# Patient Record
Sex: FEMALE | URBAN - METROPOLITAN AREA
[De-identification: names, ages, dates, MRNs, and addresses within clinical notes are randomized per-mention and may not be internally consistent; named-entity substitution may affect disease eponyms.]

---

## 2017-10-06 ENCOUNTER — EMERGENCY (EMERGENCY)
Facility: HOSPITAL | Age: 47
LOS: 1 days | Discharge: HOME | End: 2017-10-06
Attending: EMERGENCY MEDICINE | Admitting: EMERGENCY MEDICINE
Payer: COMMERCIAL

## 2017-10-06 VITALS
DIASTOLIC BLOOD PRESSURE: 98 MMHG | RESPIRATION RATE: 18 BRPM | SYSTOLIC BLOOD PRESSURE: 140 MMHG | TEMPERATURE: 98 F | OXYGEN SATURATION: 99 % | HEART RATE: 76 BPM

## 2017-10-06 PROCEDURE — 99284 EMERGENCY DEPT VISIT MOD MDM: CPT | Mod: 25

## 2017-10-06 NOTE — ED ADULT TRIAGE NOTE - CHIEF COMPLAINT QUOTE
Pt c/o HA by right temple radiating to right ear and right jaw x3 days. Pt c/o nausea and sensitivity to light. Denies blurry or double vision, dizziness, head trauma. Pt c/o HA by right temple radiating to right ear and right jaw x3 days. Pt c/o nausea and sensitivity to light. Denies blurry or double vision, dizziness, head trauma. Denies any PMH.

## 2017-10-07 VITALS
HEART RATE: 70 BPM | SYSTOLIC BLOOD PRESSURE: 123 MMHG | RESPIRATION RATE: 20 BRPM | DIASTOLIC BLOOD PRESSURE: 90 MMHG | TEMPERATURE: 98 F | OXYGEN SATURATION: 100 %

## 2017-10-07 LAB
ALBUMIN SERPL ELPH-MCNC: 4 G/DL — SIGNIFICANT CHANGE UP (ref 3.3–5)
ALP SERPL-CCNC: 75 U/L — SIGNIFICANT CHANGE UP (ref 40–120)
ALT FLD-CCNC: 16 U/L — SIGNIFICANT CHANGE UP (ref 4–33)
AST SERPL-CCNC: 20 U/L — SIGNIFICANT CHANGE UP (ref 4–32)
BASOPHILS # BLD AUTO: 0.07 K/UL — SIGNIFICANT CHANGE UP (ref 0–0.2)
BASOPHILS NFR BLD AUTO: 0.7 % — SIGNIFICANT CHANGE UP (ref 0–2)
BILIRUB SERPL-MCNC: < 0.2 MG/DL — LOW (ref 0.2–1.2)
BUN SERPL-MCNC: 9 MG/DL — SIGNIFICANT CHANGE UP (ref 7–23)
CALCIUM SERPL-MCNC: 9.3 MG/DL — SIGNIFICANT CHANGE UP (ref 8.4–10.5)
CHLORIDE SERPL-SCNC: 102 MMOL/L — SIGNIFICANT CHANGE UP (ref 98–107)
CO2 SERPL-SCNC: 23 MMOL/L — SIGNIFICANT CHANGE UP (ref 22–31)
CREAT SERPL-MCNC: 0.84 MG/DL — SIGNIFICANT CHANGE UP (ref 0.5–1.3)
EOSINOPHIL # BLD AUTO: 0.19 K/UL — SIGNIFICANT CHANGE UP (ref 0–0.5)
EOSINOPHIL NFR BLD AUTO: 2 % — SIGNIFICANT CHANGE UP (ref 0–6)
GLUCOSE SERPL-MCNC: 113 MG/DL — HIGH (ref 70–99)
HCT VFR BLD CALC: 43 % — SIGNIFICANT CHANGE UP (ref 34.5–45)
HGB BLD-MCNC: 13.4 G/DL — SIGNIFICANT CHANGE UP (ref 11.5–15.5)
IMM GRANULOCYTES # BLD AUTO: 0.01 # — SIGNIFICANT CHANGE UP
IMM GRANULOCYTES NFR BLD AUTO: 0.1 % — SIGNIFICANT CHANGE UP (ref 0–1.5)
LYMPHOCYTES # BLD AUTO: 3.12 K/UL — SIGNIFICANT CHANGE UP (ref 1–3.3)
LYMPHOCYTES # BLD AUTO: 32.5 % — SIGNIFICANT CHANGE UP (ref 13–44)
MCHC RBC-ENTMCNC: 26.4 PG — LOW (ref 27–34)
MCHC RBC-ENTMCNC: 31.2 % — LOW (ref 32–36)
MCV RBC AUTO: 84.6 FL — SIGNIFICANT CHANGE UP (ref 80–100)
MONOCYTES # BLD AUTO: 0.57 K/UL — SIGNIFICANT CHANGE UP (ref 0–0.9)
MONOCYTES NFR BLD AUTO: 5.9 % — SIGNIFICANT CHANGE UP (ref 2–14)
NEUTROPHILS # BLD AUTO: 5.65 K/UL — SIGNIFICANT CHANGE UP (ref 1.8–7.4)
NEUTROPHILS NFR BLD AUTO: 58.8 % — SIGNIFICANT CHANGE UP (ref 43–77)
NRBC # FLD: 0 — SIGNIFICANT CHANGE UP
PLATELET # BLD AUTO: 291 K/UL — SIGNIFICANT CHANGE UP (ref 150–400)
PMV BLD: 11.3 FL — SIGNIFICANT CHANGE UP (ref 7–13)
POTASSIUM SERPL-MCNC: 4.2 MMOL/L — SIGNIFICANT CHANGE UP (ref 3.5–5.3)
POTASSIUM SERPL-SCNC: 4.2 MMOL/L — SIGNIFICANT CHANGE UP (ref 3.5–5.3)
PROT SERPL-MCNC: 7.6 G/DL — SIGNIFICANT CHANGE UP (ref 6–8.3)
RBC # BLD: 5.08 M/UL — SIGNIFICANT CHANGE UP (ref 3.8–5.2)
RBC # FLD: 14.6 % — HIGH (ref 10.3–14.5)
SODIUM SERPL-SCNC: 140 MMOL/L — SIGNIFICANT CHANGE UP (ref 135–145)
WBC # BLD: 9.61 K/UL — SIGNIFICANT CHANGE UP (ref 3.8–10.5)
WBC # FLD AUTO: 9.61 K/UL — SIGNIFICANT CHANGE UP (ref 3.8–10.5)

## 2017-10-07 RX ORDER — KETOROLAC TROMETHAMINE 30 MG/ML
30 SYRINGE (ML) INJECTION ONCE
Qty: 0 | Refills: 0 | Status: DISCONTINUED | OUTPATIENT
Start: 2017-10-07 | End: 2017-10-07

## 2017-10-07 RX ORDER — METOCLOPRAMIDE HCL 10 MG
10 TABLET ORAL ONCE
Qty: 0 | Refills: 0 | Status: COMPLETED | OUTPATIENT
Start: 2017-10-07 | End: 2017-10-07

## 2017-10-07 RX ORDER — SODIUM CHLORIDE 9 MG/ML
1000 INJECTION INTRAMUSCULAR; INTRAVENOUS; SUBCUTANEOUS ONCE
Qty: 0 | Refills: 0 | Status: COMPLETED | OUTPATIENT
Start: 2017-10-07 | End: 2017-10-07

## 2017-10-07 RX ADMIN — Medication 30 MILLIGRAM(S): at 04:10

## 2017-10-07 RX ADMIN — SODIUM CHLORIDE 1000 MILLILITER(S): 9 INJECTION INTRAMUSCULAR; INTRAVENOUS; SUBCUTANEOUS at 02:25

## 2017-10-07 RX ADMIN — Medication 10 MILLIGRAM(S): at 02:25

## 2017-10-07 NOTE — ED PROVIDER NOTE - PROGRESS NOTE DETAILS
Pt states feeling better. Tolerating PO and ambulating without any difficulty. Given f/u with Neuro.

## 2017-10-07 NOTE — ED PROVIDER NOTE - ATTENDING CONTRIBUTION TO CARE
Pt was seen and evaluated by me. Pt states over the past 4 days having right sided headache. Pt states having similar previously with associated nausea. Pt denies any vision changes, fever, chills, SOB, chest pain, or abd pain. Pt states she had a MRI last year that was normal but has not followed up since Neuro has retired. Lungs CTA b/l. RRR. Abd soft, non-tender. No focal deficits.

## 2017-10-07 NOTE — ED PROVIDER NOTE - MEDICAL DECISION MAKING DETAILS
48yo female with no significant PMHx presenting with headache. Likely historian. 48yo female with no significant PMHx presenting with headache. Reglan, 1L IV fluids,

## 2017-10-07 NOTE — ED PROVIDER NOTE - CHPI ED SYMPTOMS NEG
no tingling/no fever/no vomiting/no numbness/no chills/no dizziness/no weakness/no decreased eating/drinking

## 2017-10-07 NOTE — ED ADULT NURSE NOTE - OBJECTIVE STATEMENT
pt. a&ox3, came in c/o rt temporal headache radiating to rt ear and rt jaw for many years but came in because it's been increasing. as per pt, she usually gets the same h/a every 3-4 mos but lately, it's been ever 1-2 weeks that lasts 3-4 days. denies any n/v nor dizziness nor blurry vision nor numbness. pt. c/o "ringing" in the rt ear. denies any fever nor head trauma. pt. states she takes Naproxen or Advil but no relief. awaits MD kirkpatrick. will continue to monitor

## 2017-10-07 NOTE — ED ADULT NURSE NOTE - CHIEF COMPLAINT QUOTE
Pt c/o HA by right temple radiating to right ear and right jaw x3 days. Pt c/o nausea and sensitivity to light. Denies blurry or double vision, dizziness, head trauma. Denies any PMH.

## 2023-09-20 NOTE — ED ADULT TRIAGE NOTE - HEART RATE (BEATS/MIN)
A High Calorie/High Protein diet is recommended to meet your nutritional needs:  Recommend taking two (2) protein shakes daily for one month following discharge from hospital for muscle preservation and healing promotion. Protein shake should contain at least 18 grams protein per shake or more.   Consider an unflavored protein powder (Vital Protein, UN[employer]ury, Isopure or similar store-brand powders) dissolved into hot/warm fluids like tea/coffee, broth, soups, etc.  Consider a high protein/high calorie shake (Ensure Complete, Ensure Plus, Boost High Protein, Boost Very High Calorie or store brand equivalents)    Aim to eat 4-6 smaller, protein-containing meals/snacks daily if you have a smaller appetite to encourage calories and protein throughout the day. Protein sources include eggs, chicken/turkey, fish, lean meats, dairy (if tolerated) - Greek yogurt has more protein than regular yogurt, nuts/nut butters, etc.    A dietitian can help personalize your nutrition plan if you are on a special diet or have difficulty swallowing.    Nutrition-Related Questions: outpatient RD (Dietitian)  Phone: [phone]     [phone]